# Patient Record
(demographics unavailable — no encounter records)

---

## 2025-05-13 NOTE — ASSESSMENT
[FreeTextEntry1] : 26-year-old male presenting today for follow-up visit for migraine headaches.  Patient's headaches are well-controlled with Emgality for prevention.  Headaches are about 5 days/month and using Nurtec when needed, with good relief.  We discussed that at this time doing injections with the medications is not completely necessary as headaches are well-controlled and patient reports minimal neck pain.  We discussed that should the headaches become uncontrolled again or if the frequency increases along with the neck pain then we should consider trialing for injections at that time.  Patient is agreeable with this plan, no new issues or complaints, exam remains stable.  Plan: - Continue Emgality once monthly injectable for prevention of migraine headaches.  Patient should continue taking every 28 days. - Continue Nurtec 75 mg as needed at the onset of headaches for migraine treatment. - Should occipital symptoms and neck pain return or migraines worsen, would consider occipital nerve blocks at that time, would have to get insurance approval first as this has previously been denied. - Patient may follow-up once yearly if migraines are stable, for medication renewals.

## 2025-05-13 NOTE — PHYSICAL EXAM
[FreeTextEntry1] : GENERAL PHYSICAL EXAM: GEN: no distress, normal affect EYES: sclera white, conjunctiva clear, no nystagmus PULM: no respiratory distress EXT: no edema, no cyanosis MSK: muscle tone and strength normal SKIN: warm, dry, no rash or lesion on exposed skin   NEUROLOGICAL EXAM: Mental Status Orientation: alert and oriented to person, place, time, and situation Language: clear and fluent, intact comprehension and repetition   Cranial Nerves II: visual fields full to confrontation III, IV, VI: PERRL, EOMI V, VII: facial sensation and movement intact and symmetric VIII: hearing intact IX, X: uvula midline, soft palate elevates normally XI: BL shoulder shrug intact XII: tongue midline   Motor Bilateral muscle strength 5/5 in UE and LE, proximally and distally Tone and bulk are normal in upper and lower limbs   Gait Normal stance, stride, and pivot turn

## 2025-05-13 NOTE — HISTORY OF PRESENT ILLNESS
[FreeTextEntry1] : 5/12/25: Patient presents today for follow-up visit for headaches.  Patient reports that he is getting about 5 headache days per month.  He now gets an aura before the headaches, wavy lines that impair his vision and then the headache begins about 20 minutes later.  He reports that severity is now about a 7 out of 10 and can last about 1 hour if he takes the Nurtec, can last several hours if he does not medicate.  Patient is doing well with the Emgality for migraine prevention.  Patient's insurance did not cover injections previously, patient is curious as his insurance change if they will be covered now. Denies any new issues or complaints.     7/19/24: 25-year-old male presents to clinic with parent for c/o headaches x 1 year. Headaches are globally located to include the occipital and bilateral temporal parietal regions. Patient now on nurtec for acute attacks and emgality 120mg/ml [id number] for ppx. prior to treatment patient experience HA daily with severity 8/10 and 24htr duration. now patient experiences ~ 10 HA/month with a severity 5/10 and 3-4hr duration where HA waxes and wanes. Patient still c/o of dysesthesia with electrical shocks and burning pain that rise proximally from cervical spine and are most intense at the greater/lessor occipital plexus. Patient insurance denied injections of the same previously.

## 2025-05-13 NOTE — HISTORY OF PRESENT ILLNESS
[FreeTextEntry1] : 5/12/25: Patient presents today for follow-up visit for headaches.  Patient reports that he is getting about 5 headache days per month.  He now gets an aura before the headaches, wavy lines that impair his vision and then the headache begins about 20 minutes later.  He reports that severity is now about a 7 out of 10 and can last about 1 hour if he takes the Nurtec, can last several hours if he does not medicate.  Patient is doing well with the Emgality for migraine prevention.  Patient's insurance did not cover injections previously, patient is curious as his insurance change if they will be covered now. Denies any new issues or complaints.     7/19/24: 25-year-old male presents to clinic with parent for c/o headaches x 1 year. Headaches are globally located to include the occipital and bilateral temporal parietal regions. Patient now on nurtec for acute attacks and emgality 120mg/ml [id number] for ppx. prior to treatment patient experience HA daily with severity 8/10 and 24htr duration. now patient experiences ~ 10 HA/month with a severity 5/10 and 3-4hr duration where HA waxes and wanes. Patient still c/o of dysesthesia with electrical shocks and burning pain that rise proximally from cervical spine and are most intense at the greater/lessor occipital plexus. Patient insurance denied injections of the same previously.